# Patient Record
Sex: MALE | Race: WHITE
[De-identification: names, ages, dates, MRNs, and addresses within clinical notes are randomized per-mention and may not be internally consistent; named-entity substitution may affect disease eponyms.]

---

## 2017-09-18 ENCOUNTER — HOSPITAL ENCOUNTER (EMERGENCY)
Dept: HOSPITAL 56 - MW.ED | Age: 42
Discharge: HOME | End: 2017-09-18
Payer: MEDICARE

## 2017-09-18 VITALS — DIASTOLIC BLOOD PRESSURE: 51 MMHG | SYSTOLIC BLOOD PRESSURE: 104 MMHG

## 2017-09-18 DIAGNOSIS — Z88.5: ICD-10-CM

## 2017-09-18 DIAGNOSIS — B34.9: Primary | ICD-10-CM

## 2017-09-18 DIAGNOSIS — Z79.899: ICD-10-CM

## 2017-09-18 DIAGNOSIS — Z79.82: ICD-10-CM

## 2017-09-18 DIAGNOSIS — E11.9: ICD-10-CM

## 2017-09-18 LAB
CHLORIDE SERPL-SCNC: 104 MMOL/L (ref 98–110)
SODIUM SERPL-SCNC: 139 MMOL/L (ref 136–146)

## 2017-09-18 PROCEDURE — 96361 HYDRATE IV INFUSION ADD-ON: CPT

## 2017-09-18 PROCEDURE — 81001 URINALYSIS AUTO W/SCOPE: CPT

## 2017-09-18 PROCEDURE — 36415 COLL VENOUS BLD VENIPUNCTURE: CPT

## 2017-09-18 PROCEDURE — 96365 THER/PROPH/DIAG IV INF INIT: CPT

## 2017-09-18 PROCEDURE — 80053 COMPREHEN METABOLIC PANEL: CPT

## 2017-09-18 PROCEDURE — 96375 TX/PRO/DX INJ NEW DRUG ADDON: CPT

## 2017-09-18 PROCEDURE — 99284 EMERGENCY DEPT VISIT MOD MDM: CPT

## 2017-09-18 PROCEDURE — 85025 COMPLETE CBC W/AUTO DIFF WBC: CPT

## 2017-09-18 PROCEDURE — 71020: CPT

## 2017-09-18 NOTE — EDM.PDOC
ED HPI GENERAL MEDICAL PROBLEM





- General


Chief Complaint: Fever


Stated Complaint: FEVER


Time Seen by Provider: 09/18/17 19:37





- History of Present Illness


INITIAL COMMENTS - FREE TEXT/NARRATIVE: 





HISTORY AND PHYSICAL:





History of present illness:


Patient 42-year-old white male history diabetes who presents with concern of 

vomiting and fever states he had a temperature of 102 at home and vomited 3 

times a night he denies abdominal pain chest pain cough or other concern. Upon 

arrival is temperature is 90.9 he states he did take Tylenol at 6:00





Review of systems: 


As per history of present illness and below otherwise all systems reviewed and 

negative.





Past medical history: 


As per history of present illness and as reviewed below otherwise 

noncontributory.





Surgical history: 


As per history of present illness and as reviewed below otherwise 

noncontributory.





Social history: 


No reported history of drug or alcohol abuse.





Family history: 


As per history of present illness and as reviewed below otherwise 

noncontributory.





Physical exam:


HEENT: Atraumatic, normocephalic, pupils reactive, negative for conjunctival 

pallor or scleral icterus, mucous membranes moist, throat clear, neck supple, 

nontender, trachea midline.


Lungs: Clear to auscultation, breath sounds equal bilaterally, chest nontender.


Heart: S1S2, regular, negative for clicks, rubs, or JVD.


Abdomen: Soft, nondistended, nontender. Negative for masses or 

hepatosplenomegaly. Negative for costovertebral tenderness.


Pelvis: Stable nontender.


Genitourinary: Deferred.


Rectal: Deferred.


Extremities: Atraumatic, negative for cords or calf pain. Neurovascular 

unremarkable.


Neuro: Awake, alert, oriented. Cranial nerves II through XII unremarkable. 

Cerebellum unremarkable. Motor and sensory unremarkable throughout. Exam 

nonfocal.





Diagnostics:


CBC CMP chest x-ray UA





Therapeutics:


Normal saline 1 L bolus Zofran 4 mg IV





Impression: 


#1 history of fever #2 vomiting #3 history diabetes





Definitive disposition and diagnosis as appropriate pending reevaluation and 

review of above.





- Related Data


 Allergies











Allergy/AdvReac Type Severity Reaction Status Date / Time


 


codeine AdvReac  Nose Bleeds Verified 09/18/17 19:37











Home Meds: 


 Home Meds





Aspirin [Adult Low Dose Aspirin EC] 81 mg PO QID 07/01/14 [History]


Budesonide/Formoterol [Symbicort 160-4.5 Mcg Inhaler] 1 puff INH BID 07/01/14 [

History]


Lansoprazole [Prevacid] 15 mg PO DAILY 07/01/14 [History]


carBAMazepine [Tegretol XR] 200 mg PO BID 07/01/14 [History]











Past Medical History


Neurological History: Reports: Seizure





Social & Family History





- Tobacco Use


Smoking Status *Q: Never Smoker





- Recreational Drug Use


Recreational Drug Use: No





ED ROS GENERAL





- Review of Systems


Review Of Systems: ROS reveals no pertinent complaints other than HPI.





ED EXAM, GENERAL





- Physical Exam


Exam: See Below





Course





- Vital Signs


Last Recorded V/S: 


 Last Vital Signs











Temp  38.2 C H  09/18/17 21:50


 


Pulse  106 H  09/18/17 21:50


 


Resp  22 H  09/18/17 21:50


 


BP  112/60   09/18/17 21:50


 


Pulse Ox  90 L  09/18/17 21:50














- Orders/Labs/Meds


Orders: 


 Active Orders 24 hr











 Category Date Time Status


 


 Chest 2V [CR] Stat Exams  09/18/17 19:41 Taken


 


 Sodium Chloride 0.9% [Saline Flush] Med  09/18/17 19:41 Active





 10 ml FLUSH ASDIRECTED PRN   


 


 Sodium Chloride 0.9% [Saline Flush] Med  09/18/17 19:41 Active





 2.5 ml FLUSH ASDIRECTED PRN   


 


 Saline Lock Insert [OM.PC] Stat Oth  09/18/17 19:40 Ordered








 Medication Orders





Sodium Chloride (Saline Flush)  10 ml FLUSH ASDIRECTED PRN


   PRN Reason: Keep Vein Open


   Last Admin: 09/18/17 20:02  Dose: 10 ml


Sodium Chloride (Saline Flush)  2.5 ml FLUSH ASDIRECTED PRN


   PRN Reason: Keep Vein Open


   Last Admin: 09/18/17 20:02  Dose: 2.5 ml








Labs: 


 Laboratory Tests











  09/18/17 09/18/17 09/18/17 Range/Units





  19:56 19:56 21:05 


 


WBC  10.05    (4.0-11.0)  K/uL


 


RBC  4.24 L    (4.50-5.90)  M/uL


 


Hgb  13.4    (13.0-17.0)  g/dL


 


Hct  41.9    (38.0-50.0)  %


 


MCV  98.8 H    (80.0-98.0)  fL


 


MCH  31.6    (27.0-32.0)  pg


 


MCHC  32.0    (31.0-37.0)  g/dL


 


RDW Std Deviation  49.7    (28.0-62.0)  fl


 


RDW Coeff of Lesvia  14    (11.0-15.0)  %


 


Plt Count  149 L    (150-400)  K/uL


 


MPV  10.60    (7.40-12.00)  fL


 


Neut % (Auto)  81.7 H    (48.0-80.0)  %


 


Lymph % (Auto)  9.2 L    (16.0-40.0)  %


 


Mono % (Auto)  9.0    (0.0-15.0)  %


 


Eos % (Auto)  0.0    (0.0-7.0)  %


 


Baso % (Auto)  0.1    (0.0-1.5)  %


 


Neut # (Auto)  8.2 H    (1.4-5.7)  K/uL


 


Lymph # (Auto)  0.9    (0.6-2.4)  K/uL


 


Mono # (Auto)  0.9 H    (0.0-0.8)  K/uL


 


Eos # (Auto)  0.0    (0.0-0.7)  K/uL


 


Baso # (Auto)  0.0    (0.0-0.1)  K/uL


 


Nucleated RBC %  0.0    /100WBC


 


Nucleated RBCs #  0    K/uL


 


Sodium   139   (136-146)  mmol/L


 


Potassium   4.4   (3.5-5.1)  mmol/L


 


Chloride   104   ()  mmol/L


 


Carbon Dioxide   24   (21-31)  mmol/L


 


BUN   13   (6.0-23.0)  mg/dL


 


Creatinine   1.2   (0.6-1.5)  mg/dL


 


Est Cr Clr Drug Dosing   97.45   mL/min


 


Estimated GFR (MDRD)   > 60.0   ml/min


 


Glucose   151 H   ()  mg/dL


 


Calcium   9.0   (8.8-10.8)  mg/dL


 


Total Bilirubin   0.8   (0.1-1.5)  mg/dL


 


AST   23   (5-40)  IU/L


 


ALT   18   (8-54)  IU/L


 


Alkaline Phosphatase   89   ()  


 


Total Protein   7.7   (6.0-8.0)  g/dL


 


Albumin   3.9   (3.5-5.0)  g/dL


 


Globulin   3.8 H   (2.0-3.5)  g/dL


 


Albumin/Globulin Ratio   1.0 L   (1.3-2.8)  


 


Urine Color    YELLOW  


 


Urine Appearance    CLEAR  


 


Urine pH    7.5  (5.0-8.0)  


 


Ur Specific Gravity    1.015  (1.001-1.035)  


 


Urine Protein    30  (NEGATIVE)  mg/dL


 


Urine Glucose (UA)    NEGATIVE  (NEGATIVE)  mg/dL


 


Urine Ketones    TRACE H  (NEGATIVE)  mg/dL


 


Urine Occult Blood    NEGATIVE  (NEGATIVE)  


 


Urine Nitrite    NEGATIVE  (NEGATIVE)  


 


Urine Bilirubin    SMALL H  (NEGATIVE)  


 


Urine Ictotest    NEGATIVE  


 


Urine Urobilinogen    1.0  (<2.0)  EU/dL


 


Ur Leukocyte Esterase    NEGATIVE  (NEGATIVE)  


 


Urine RBC    0-1  (0-2/HPF)  


 


Urine WBC    3-5  (0-5/HPF)  


 


Ur Epithelial Cells    MODERATE  (NONE-FEW)  


 


Urine Bacteria    FEW  (NEGATIVE)  











Meds: 


Medications











Generic Name Dose Route Start Last Admin





  Trade Name Alona  PRN Reason Stop Dose Admin


 


Sodium Chloride  10 ml  09/18/17 19:41  09/18/17 20:02





  Saline Flush  FLUSH   10 ml





  ASDIRECTED PRN   Administration





  Keep Vein Open   


 


Sodium Chloride  2.5 ml  09/18/17 19:41  09/18/17 20:02





  Saline Flush  FLUSH   2.5 ml





  ASDIRECTED PRN   Administration





  Keep Vein Open   














Discontinued Medications














Generic Name Dose Route Start Last Admin





  Trade Name Alona  PRN Reason Stop Dose Admin


 


Sodium Chloride  1,000 mls @ 999 mls/hr  09/18/17 19:41  09/18/17 20:01





  Normal Saline  IV  09/18/17 20:41  999 mls/hr





  STAT ONE   Administration


 


Ondansetron HCl  4 mg  09/18/17 19:41  09/18/17 20:01





  Zofran  IVPUSH  09/18/17 19:42  4 mg





  ONETIME ONE   Administration














Departure





- Departure


Time of Disposition: 21:56


Disposition: Home, Self-Care 01


Condition: Good


Clinical Impression: 


 Viral syndrome








- Discharge Information


Forms:  ED Department Discharge


Additional Instructions: 


The following information is given to patients seen in the emergency department 

who are being discharged to home. This information is to outline your options 

for follow-up care. We provide all patients seen in our emergency department 

with a follow-up referral.





The need for follow-up, as well as the timing and circumstances, are variable 

depending upon the specifics of your emergency department visit.





If you don't have a primary care physician on staff, we will provide you with a 

referral. We always advise you to contact your personal physician following an 

emergency department visit to inform them of the circumstance of the visit and 

for follow-up with them and/or the need for any referrals to a consulting 

specialist.





The emergency department will also refer you to a specialist when appropriate. 

This referral assures that you have the opportunity for followup care with a 

specialist. All of these measure are taken in an effort to provide you with 

optimal care, which includes your followup.





Under all circumstances we always encourage you to contact your private 

physician who remains a resource for coordinating  your care. When calling for 

followup care, please make the office aware that this follow-up is from your 

recent emergency room visit. If for any reason you are refused follow-up, 

please contact the [facility] emergency department at [phone] 

and asked to speak to the emergency department charge nurse.


























Push fluids clear liquids as directed continue current medications return as 

needed as discussed Motrin/Tylenol as directed





- My Orders


Last 24 Hours: 


My Active Orders





09/18/17 19:40


Saline Lock Insert [OM.PC] Stat 





09/18/17 19:41


Chest 2V [CR] Stat 


Sodium Chloride 0.9% [Saline Flush]   10 ml FLUSH ASDIRECTED PRN 


Sodium Chloride 0.9% [Saline Flush]   2.5 ml FLUSH ASDIRECTED PRN 














- Assessment/Plan


Last 24 Hours: 


My Active Orders





09/18/17 19:40


Saline Lock Insert [OM.PC] Stat 





09/18/17 19:41


Chest 2V [CR] Stat 


Sodium Chloride 0.9% [Saline Flush]   10 ml FLUSH ASDIRECTED PRN 


Sodium Chloride 0.9% [Saline Flush]   2.5 ml FLUSH ASDIRECTED PRN

## 2017-09-19 NOTE — CR
EXAM DATE: 17



PATIENT'S AGE: 42





Patient: CHANDU HONG



Facility: [place], [place]

Patient ID: [id number]

Site Patient ID: [id number].

Site Accession #: [id number]

: 1975

Study: XRay Chest [id number]/18/2017 9:11:55 PM

Ordering Physician: Westley Castañeda



Final Report: 

CHEST 2 VIEWS



INDICATION:

Short of breath. 



IMPRESSION:

Normal heart size and vascular pattern.

Lungs are clear.

No pneumothorax or pleural abnormality.





Dictated by Pineda Ribeiro MD @ Sep 18 2017 9:35PM

(Electronic Signature)



Report Signed by Proxy.
[facility]

## 2017-11-14 ENCOUNTER — HOSPITAL ENCOUNTER (OUTPATIENT)
Dept: HOSPITAL 56 - MW.SDS | Age: 42
Discharge: HOME | End: 2017-11-14
Attending: SURGERY
Payer: COMMERCIAL

## 2017-11-14 VITALS — DIASTOLIC BLOOD PRESSURE: 63 MMHG | SYSTOLIC BLOOD PRESSURE: 109 MMHG

## 2017-11-14 DIAGNOSIS — Z79.84: ICD-10-CM

## 2017-11-14 DIAGNOSIS — Z79.899: ICD-10-CM

## 2017-11-14 DIAGNOSIS — K29.50: Primary | ICD-10-CM

## 2017-11-14 DIAGNOSIS — E66.01: ICD-10-CM

## 2017-11-14 DIAGNOSIS — Z88.8: ICD-10-CM

## 2017-11-14 PROCEDURE — 43239 EGD BIOPSY SINGLE/MULTIPLE: CPT

## 2017-11-14 NOTE — PCM.OPNOTE
- General Post-Op/Procedure Note


Date of Surgery/Procedure: 11/14/17


Operative Procedure(s): egd w bx


Findings: 


see dict [id number]


Pre Op Diagnosis: hx of hematemisis


Anesthesia Technique: Moderate Sedation


Primary Surgeon: Ruddy Bailey


Pathology: 





egd bx


Complications: None


Condition: Good

## 2017-11-14 NOTE — PCM.PREANE
Preanesthetic Assessment





- Anesthesia/Transfusion/Family Hx


Anesthesia History: No Prior Anesthesia


Family History of Anesthesia Reaction: No


Transfusion History: No Prior Transfusion(s)





- Review of Systems


General: No Symptoms


Pulmonary: No Symptoms


Cardiovascular: No Symptoms


Neurological: No Symptoms


Other: Reports: None





- Physical Assessment


NPO Status Date: 11/13/17


O2 Sat by Pulse Oximetry: 97


Respiratory Rate: 16


Vital Signs: 





 Last Vital Signs











Temp  36.2 C   11/14/17 08:13


 


Pulse  71   11/14/17 08:13


 


Resp  16   11/14/17 08:13


 


BP  116/70   11/14/17 08:13


 


Pulse Ox  97   11/14/17 08:13











Height: 1.8 m


Weight: 174.633 kg


ASA Class: 3


Mental Status: Alert & Oriented x3


Airway Class: Mallampati = 2


Dentition: Reports: Normal Dentition


ROM/Head Extension: Full


Lungs: Clear to Auscultation, Normal Respiratory Effort


Cardiovascular: Regular Rate, Regular Rhythm





- Allergies


Allergies/Adverse Reactions: 


 Allergies











Allergy/AdvReac Type Severity Reaction Status Date / Time


 


codeine AdvReac  Nose Bleeds Verified 11/08/17 15:41














- Anesthesia Plan


Pre-Op Medication Ordered: None





- Acknowledgements


Anesthesia Type Planned: MAC


Pt an Appropriate Candidate for the Planned Anesthesia: Yes


Alternatives and Risks of Anesthesia Discussed w Pt/Guardian: Yes


Pt/Guardian Understands and Agrees with Anesthesia Plan: Yes


Additional Comments: 





PMH: super morbid oesity, DM2, HTN, HLD, seizure disorder (last seizure > 2 yr 

ago)





PreAnesthesia Questionnaire


Other HEENT History: has upper and lower dentures


Cardiovascular History: Reports: High Cholesterol, Hypertension


Respiratory History: Reports: Asthma


Gastrointestinal History: Reports: GERD


Neurological History: Reports: Brain Injury, Concussion, Seizure


Other Neuro History: las seizure was "many years ago"


Endocrine/Metabolic History: Reports: Diabetes, Type II, Obesity/BMI 30+


Dermatologic History: Reports: Other (See Below)


Other Dermatologic History: states has had "blood clots" in his legs,  did not 

take anticoagulants (uses cream)





- Infectious Disease History


Infectious Disease History: Reports: Chicken Pox





- SUBSTANCE USE


Smoking Status *Q: Never Smoker


Second Hand Smoke Exposure: No


Recreational Drug Use History: No





- HOME MEDS


Home Medications: 


 Home Meds





Budesonide/Formoterol [Symbicort 160-4.5 Mcg Inhaler] 1 puff INH BID 07/01/14 [

History]


carBAMazepine [Tegretol XR] 200 mg PO BID 07/01/14 [History]


Lisinopril 10 mg PO DAILY 09/18/17 [History]


atorvaSTATin [Lipitor] 10 mg PO BEDTIME 09/18/17 [History]


metFORMIN [Glucophage XR] 500 mg PO BIDMEALS 09/18/17 [History]


Silver Sulfadiazine [Silvadene 1% Cream 20 GM] 1 dose TOP ASDIRECTED PRN 11/08/ 17 [History]











- CURRENT (IN HOUSE) MEDS


Current Meds: 





 Current Medications





Lactated Ringer's (Ringers, Lactated)  1,000 mls @ 125 mls/hr IV ASDIRECTED HESHAM


   Last Admin: 11/14/17 08:15 Dose:  125 mls/hr

## 2017-11-14 NOTE — OR
SURGEON:

Ruddy Bailey MD

 

DATE OF PROCEDURE:  11/14/2017

 

PREOPERATIVE DIAGNOSIS:

History of hematemesis.

 

POSTOPERATIVE DIAGNOSIS:

Gastritis.

 

PROCEDURE PERFORMED:

EGD with biopsy.

 

DESCRIPTION OF PROCEDURE:

EGD: The patient was taken to the endoscopy room, and with the CRNA, Diprivan

was administered.

 

A well-lubricated EGD scope was gently inserted through the oropharynx, down the

esophagus, passing through the gastroesophageal junction, into the stomach.  The

mucosa was examined upon the passage.  Any etiology will be noted.  Once in the

stomach, we continued to advance to the distal antrum, passed through the

pylorus into the second portion of the duodenum.  Again, the mucosa was examined

for any abnormality and etiology.

 

The scope was then retrieved back to the stomach and then retroflexed to look at

the fundus of the stomach.  If a biopsy was indicated, we will biopsy the

antrum, body, and gastroesophageal junction.  The air will be sucked out while

the scope is retrieved to reduce the patient's discomfort.

 

The patient tolerated the procedure well.  There were no intraoperative

complications.  Dr. Bailey was present through the whole procedure.

 

Prior to surgery, a time-out had been called, the patient identified, procedure

identified and antibiotic administered.

 

FINDINGS:

1. The patient is easily sedated with CRNA and Diprivan.  The patient is

    soundly snoring.

2. Proximal esophagus and oropharynx, free of disease and normal in

    appearance.  No inflammation, no blood, no ulcer, and GE junction at 40,

    shows mild color change, salmon color change, consistent with mild acid

    reflux.  Stomach rugae is normal in appearance and antrum is quite

    inflamed.  Again, there is no ulcer, blood, food, or bile observed.

    Duodenum is grossly normal in appearance, but is white consistent with

    probably have some food after midnight.  Scope retrieved back to the

    stomach.  Retroflexed look at the fundus of stomach, no hiatal hernia.

    Biopsy done at antrum, body, and GE junction at 40, does have one area in

    the antrum quite hyperemic. Suck out the air while scope

    pulling out.

 

 

AKANKSHA FAITH

DD:  11/14/2017 09:59:26

DT:  11/14/2017 14:01:41

Job #:  [id number]

SHAGGY

## 2018-01-12 ENCOUNTER — HOSPITAL ENCOUNTER (EMERGENCY)
Dept: HOSPITAL 56 - MW.ED | Age: 43
Discharge: HOME | End: 2018-01-12
Payer: MEDICARE

## 2018-01-12 VITALS — DIASTOLIC BLOOD PRESSURE: 47 MMHG | SYSTOLIC BLOOD PRESSURE: 112 MMHG

## 2018-01-12 DIAGNOSIS — Z79.84: ICD-10-CM

## 2018-01-12 DIAGNOSIS — Z79.899: ICD-10-CM

## 2018-01-12 DIAGNOSIS — E11.9: ICD-10-CM

## 2018-01-12 DIAGNOSIS — E78.00: ICD-10-CM

## 2018-01-12 DIAGNOSIS — J45.909: ICD-10-CM

## 2018-01-12 DIAGNOSIS — B34.9: Primary | ICD-10-CM

## 2018-01-12 DIAGNOSIS — I10: ICD-10-CM

## 2018-01-12 DIAGNOSIS — Z88.5: ICD-10-CM

## 2018-01-12 LAB
CHLORIDE SERPL-SCNC: 106 MMOL/L (ref 98–110)
SODIUM SERPL-SCNC: 140 MMOL/L (ref 136–146)

## 2018-01-12 PROCEDURE — 83690 ASSAY OF LIPASE: CPT

## 2018-01-12 PROCEDURE — 94640 AIRWAY INHALATION TREATMENT: CPT

## 2018-01-12 PROCEDURE — 96361 HYDRATE IV INFUSION ADD-ON: CPT

## 2018-01-12 PROCEDURE — 99284 EMERGENCY DEPT VISIT MOD MDM: CPT

## 2018-01-12 PROCEDURE — 83605 ASSAY OF LACTIC ACID: CPT

## 2018-01-12 PROCEDURE — 85025 COMPLETE CBC W/AUTO DIFF WBC: CPT

## 2018-01-12 PROCEDURE — 36415 COLL VENOUS BLD VENIPUNCTURE: CPT

## 2018-01-12 PROCEDURE — 80053 COMPREHEN METABOLIC PANEL: CPT

## 2018-01-12 PROCEDURE — 96374 THER/PROPH/DIAG INJ IV PUSH: CPT

## 2018-01-12 PROCEDURE — 96375 TX/PRO/DX INJ NEW DRUG ADDON: CPT

## 2018-01-12 PROCEDURE — 82150 ASSAY OF AMYLASE: CPT

## 2018-01-12 NOTE — EDM.PDOC
ED HPI GENERAL MEDICAL PROBLEM





- General


Chief Complaint: Gastrointestinal Problem


Stated Complaint: VOMITTING


Time Seen by Provider: 01/12/18 10:52


Source of Information: Reports: Patient, Family


History Limitations: Reports: No Limitations





- History of Present Illness


INITIAL COMMENTS - FREE TEXT/NARRATIVE: 





HISTORY AND PHYSICAL:





History of present illness:


[Patient is brought to the emergency room by his father with complaints of 

nausea and vomiting. He lives w/ his parents, and father is his main caretaker. 

Symptoms of vomiting and fatigue began this morning and he has vomited 3 times. 

Admits to experiencing nausea and queasiness in his stomach, but denies 

abdominal pain constipation and diarrhea. 


He has not checked his temperature today. No sore throat runny nose or 

earaches. He developed some cold sores on his lips several days ago. No cough 

or chest congestion. Denies chest pain shortness of breath and difficulty 

breathing. Denies body, muscle and joint aches or pains.


Dad reports that he was recently diagnosed with parasites in his stomach 

following a scope by Dr. Bailey. He is overdue for follow-up there. Dr. Myke Pickett 

is his PCP.]





Review of systems: 


As per history of present illness and below otherwise all systems reviewed and 

negative.





Past medical history: 


As per history of present illness and as reviewed below otherwise 

noncontributory.





Surgical history: 


As per history of present illness and as reviewed below otherwise 

noncontributory.





Social history: 


No reported history of drug or alcohol abuse.





Family history: 


As per history of present illness and as reviewed below otherwise 

noncontributory.





Physical exam:


Gen.: Well-developed, well-nourished  male in no acute distress. 

Appears to be intellectually delayed as dad answers most questions and provides 

most of past medical history.


HEENT: Atraumatic, normocephalic. Cold sores present to the left side of mouth 

upper and lower lips are involved. Oral mucous membranes are pink and mildly 

dry. Neck is supple no lymphadenopathy. 


Lungs: Clear to auscultation, breath sounds equal bilaterally. No wheezing 

crackles or rales. 


Heart: S1S2, regular rate and rhythm. 


Abdomen: Obese. Abdomen is soft, nondistended, nontender. Negative for masses, 

guarding or rebound.  No costovertebral tenderness.


Pelvis: Stable nontender.


Genitourinary: Deferred.


Rectal: Deferred.


Extremities: Atraumatic, mild rubor to bilat LE. No cyanosis or edema. 

Neurovascular unremarkable.


Neuro: Awake, alert, oriented.  Motor and sensory unremarkable throughout. Exam 

nonfocal.





Diagnostics:


[CBC, CMP, UA]





Therapeutics:


[1 liter NS, 4mg Zofran IV, Tylenol 1000mg po, Toradol 30mg IV]





Impression: 


[Viral syndrome]





Plan:


[Discussed with patient and father that his symptoms are viral in nature. 

Recommend pushing fluids, get plenty of rest, Tylenol and ibuprofen as needed 

for fever or discomfort. Strict return precautions are reviewed with the 

patient. He is urged to follow-up with Dr. Pickett and Dr. Blanca. Patient and 

daughter are in agreement with today's discussion. All questions are answered 

and concerns are addressed.]





Definitive disposition and diagnosis as appropriate pending reevaluation and 

review of above.





  ** Abdominal


Pain Score (Numeric/FACES): 0





- Related Data


 Allergies











Allergy/AdvReac Type Severity Reaction Status Date / Time


 


codeine AdvReac  Nose Bleeds Verified 11/08/17 15:41











Home Meds: 


 Home Meds





Budesonide/Formoterol [Symbicort 160-4.5 MCG] 1 puff INH BID 07/01/14 [History]


carBAMazepine [Tegretol XR] 200 mg PO BID 07/01/14 [History]


Lisinopril 10 mg PO DAILY 09/18/17 [History]


atorvaSTATin [Lipitor] 10 mg PO BEDTIME 09/18/17 [History]


metFORMIN [Glucophage XR] 500 mg PO BIDMEALS 09/18/17 [History]


Silver Sulfadiazine [Silvadene 1% Cream 20 GM] 1 dose TOP ASDIRECTED PRN 11/08/ 17 [History]











Past Medical History


Other HEENT History: has upper and lower dentures


Cardiovascular History: Reports: High Cholesterol, Hypertension


Respiratory History: Reports: Asthma


Gastrointestinal History: Reports: GERD, Helicobacter Pylori


Neurological History: Reports: Brain Injury, Concussion, Seizure


Other Neuro History: las seizure was "many years ago"


Endocrine/Metabolic History: Reports: Diabetes, Type II, Obesity/BMI 30+


Dermatologic History: Reports: Other (See Below)


Other Dermatologic History: states has had "blood clots" in his legs,  did not 

take anticoagulants (uses cream)





- Infectious Disease History


Infectious Disease History: Reports: Chicken Pox





- Past Surgical History


GI Surgical History: Reports: EGD





Social & Family History





- Family History


Family Medical History: Noncontributory


Cardiac: Reports: Hypertension


Respiratory: Reports: Asthma


Neurological: Reports: Other (See Below)


Other Neurological Family History: stroke


Endocrine/Metabolic: Reports: Diabetes, type II





- Tobacco Use


Smoking Status *Q: Never Smoker


Second Hand Smoke Exposure: No





- Caffeine Use


Caffeine Use: Reports: Soda





- Recreational Drug Use


Recreational Drug Use: No


Drug Use in Last 12 Months: No





ED ROS GENERAL





- Review of Systems


Review Of Systems: ROS reveals no pertinent complaints other than HPI.





ED EXAM, GI/ABD





- Physical Exam


Exam: See Below





Course





- Vital Signs


Last Recorded V/S: 


 Last Vital Signs











Temp  99.3 F   01/12/18 12:26


 


Pulse  86   01/12/18 12:21


 


Resp  16   01/12/18 12:21


 


BP  115/44 L  01/12/18 12:21


 


Pulse Ox  92 L  01/12/18 12:21














- Orders/Labs/Meds


Orders: 


 Active Orders 24 hr











 Category Date Time Status


 


 UA W/MICROSCOPIC [URIN] Stat Lab  01/12/18 10:56 Uncollected











Labs: 


 Laboratory Tests











  01/12/18 01/12/18 01/12/18 Range/Units





  11:16 11:16 11:16 


 


WBC  10.56    (4.0-11.0)  K/uL


 


RBC  4.44 L    (4.50-5.90)  M/uL


 


Hgb  13.9    (13.0-17.0)  g/dL


 


Hct  42.7    (38.0-50.0)  %


 


MCV  96.2    (80.0-98.0)  fL


 


MCH  31.3    (27.0-32.0)  pg


 


MCHC  32.6    (31.0-37.0)  g/dL


 


RDW Std Deviation  46.4    (28.0-62.0)  fl


 


RDW Coeff of Lesvia  13    (11.0-15.0)  %


 


Plt Count  140 L    (150-400)  K/uL


 


MPV  10.50    (7.40-12.00)  fL


 


Neut % (Auto)  82.5 H    (48.0-80.0)  %


 


Lymph % (Auto)  7.8 L    (16.0-40.0)  %


 


Mono % (Auto)  9.6    (0.0-15.0)  %


 


Eos % (Auto)  0.0    (0.0-7.0)  %


 


Baso % (Auto)  0.1    (0.0-1.5)  %


 


Neut # (Auto)  8.7 H    (1.4-5.7)  K/uL


 


Lymph # (Auto)  0.8    (0.6-2.4)  K/uL


 


Mono # (Auto)  1.0 H    (0.0-0.8)  K/uL


 


Eos # (Auto)  0.0    (0.0-0.7)  K/uL


 


Baso # (Auto)  0.0    (0.0-0.1)  K/uL


 


Nucleated RBC %  0.0    /100WBC


 


Nucleated RBCs #  0    K/uL


 


Lactate    1.6  (0.20-2.00)  mmol/L


 


Sodium   140   (136-146)  mmol/L


 


Potassium   4.3   (3.5-5.1)  mmol/L


 


Chloride   106   ()  mmol/L


 


Carbon Dioxide   26   (21-31)  mmol/L


 


BUN   13   (6.0-23.0)  mg/dL


 


Creatinine   1.1   (0.6-1.5)  mg/dL


 


Est Cr Clr Drug Dosing   111.94   mL/min


 


Estimated GFR (MDRD)   > 60.0   ml/min


 


Glucose   155 H   ()  mg/dL


 


Calcium   8.5 L   (8.8-10.8)  mg/dL


 


Total Bilirubin   0.6   (0.1-1.5)  mg/dL


 


AST   17   (5-40)  IU/L


 


ALT   16   (8-54)  IU/L


 


Alkaline Phosphatase   75   ()  


 


Total Protein   7.0   (6.0-8.0)  g/dL


 


Albumin   3.6   (3.5-5.0)  g/dL


 


Globulin   3.4   (2.0-3.5)  g/dL


 


Albumin/Globulin Ratio   1.1 L   (1.3-2.8)  


 


Amylase   22   (10-90)  U/L


 


Lipase   22   (7-80)  U/L











Meds: 


Medications














Discontinued Medications














Generic Name Dose Route Start Last Admin





  Trade Name Freq  PRN Reason Stop Dose Admin


 


Acetaminophen  1,000 mg  01/12/18 11:45  01/12/18 12:26





  Tylenol Extra Strength  PO  01/12/18 11:46  1,000 mg





  ONETIME ONE   Administration


 


Sodium Chloride  1,000 mls @ 999 mls/hr  01/12/18 10:56  01/12/18 11:23





  Normal Saline  IV  01/12/18 11:56  999 mls/hr





  .Bolus ONE   Administration


 


Ketorolac Tromethamine  30 mg  01/12/18 10:56  01/12/18 11:34





  Toradol  IVPUSH  01/12/18 10:57  30 mg





  ONETIME ONE   Administration


 


Ondansetron HCl  4 mg  01/12/18 10:56  01/12/18 11:32





  Zofran  IVPUSH  01/12/18 10:57  4 mg





  ONETIME ONE   Administration














Departure





- Departure


Time of Disposition: 12:25


Disposition: Home, Self-Care 01


Condition: Good


Clinical Impression: 


 Viral syndrome








- Discharge Information


Instructions:  Viral Respiratory Infection, Easy-To-Read


Referrals: 


Myke Pickett MD [Primary Care Provider] - 


Forms:  ED Department Discharge


Additional Instructions: 


The following information is given to patients seen in the emergency department 

who are being discharged to home. This information is to outline your options 

for follow-up care. We provide all patients seen in our emergency department 

with a follow-up referral.





The need for follow-up, as well as the timing and circumstances, are variable 

depending upon the specifics of your emergency department visit.





If you don't have a primary care physician on staff, we will provide you with a 

referral. We always advise you to contact your personal physician following an 

emergency department visit to inform them of the circumstance of the visit and 

for follow-up with them and/or the need for any referrals to a consulting 

specialist.





The emergency department will also refer you to a specialist when appropriate. 

This referral assures that you have the opportunity for follow-up care with a 

specialist. All of these measure are taken in an effort to provide you with 

optimal care, which includes your follow-up.





Under all circumstances we always encourage you to contact your private 

physician who remains a resource for coordinating your care. When calling for 

follow-up care, please make the office aware that this follow-up is from your 

recent emergency room visit. If for any reason you are refused follow-up, 

please contact the [facility]  emergency 

department at [phone] and asked to speak to the emergency department 

charge nurse.








[address] [place] [address]


Phone: [phone]


Fax: [phone]








You are suffering from a viral illness. You may feel ill for a couple of days.


Recommend that you push fluids, and get plenty of rest. Recommend Tylenol 

alternating with ibuprofen for fever or discomfort.


You may take either medication every 4 hours.


Stay away from dairy products, greasy and fatty foods until you are feeling 

improved.


Follow-up with Dr. Pickett in 48-72 hours.


Return to ER as needed as discussed.








- My Orders


Last 24 Hours: 


My Active Orders





01/12/18 10:56


UA W/MICROSCOPIC [URIN] Stat 














- Assessment/Plan


Last 24 Hours: 


My Active Orders





01/12/18 10:56


UA W/MICROSCOPIC [URIN] Stat

## 2018-08-10 ENCOUNTER — HOSPITAL ENCOUNTER (OUTPATIENT)
Dept: HOSPITAL 56 - MW.SDS | Age: 43
Discharge: HOME | End: 2018-08-10
Attending: SURGERY
Payer: MEDICARE

## 2018-08-10 VITALS — SYSTOLIC BLOOD PRESSURE: 128 MMHG | DIASTOLIC BLOOD PRESSURE: 72 MMHG

## 2018-08-10 DIAGNOSIS — Z79.899: ICD-10-CM

## 2018-08-10 DIAGNOSIS — E11.9: ICD-10-CM

## 2018-08-10 DIAGNOSIS — E66.9: ICD-10-CM

## 2018-08-10 DIAGNOSIS — E78.00: ICD-10-CM

## 2018-08-10 DIAGNOSIS — I10: ICD-10-CM

## 2018-08-10 DIAGNOSIS — Z88.5: ICD-10-CM

## 2018-08-10 DIAGNOSIS — K21.0: ICD-10-CM

## 2018-08-10 DIAGNOSIS — Z79.84: ICD-10-CM

## 2018-08-10 DIAGNOSIS — K29.50: Primary | ICD-10-CM

## 2018-08-10 PROCEDURE — 43239 EGD BIOPSY SINGLE/MULTIPLE: CPT

## 2018-08-10 NOTE — PCM.OPNOTE
- General Post-Op/Procedure Note


Date of Surgery/Procedure: 08/10/18


Operative Procedure(s): egd w bx


Findings: 





see dict [id number]


Pre Op Diagnosis: bloating


Post-Op Diagnosis: Same


Anesthesia Technique: Moderate Sedation


Primary Surgeon: Ruddy Bailey


Pathology: 





egd bx


Complications: None


Condition: Good

## 2018-08-10 NOTE — PCM48HPAN
Post Anesthesia Note





- EVALUATION WITHIN 48HRS OF ANESTHETIC


Vital Signs in Normal Range: Yes


Patient Participated in Evaluation: Yes


Respiratory Function Stable: Yes


Airway Patent: Yes


Cardiovascular Function Stable: Yes


Hydration Status Stable: Yes


Pain Control Satisfactory: Yes


Nausea and Vomiting Control Satisfactory: Yes


Mental Status Recovered: Yes


Resp Rate: 12

## 2018-08-10 NOTE — PCM.PREANE
Preanesthetic Assessment





- Anesthesia/Transfusion/Family Hx


Anesthesia History: Prior Anesthesia Without Reaction


Family History of Anesthesia Reaction: No


Transfusion History: No Prior Transfusion(s)





- Review of Systems


General: No Symptoms


Cardiovascular: No Symptoms


Neurological: No Symptoms


Other: Reports: None





- Physical Assessment


NPO Status Date: 08/09/18


O2 Sat by Pulse Oximetry: 93


Respiratory Rate: 18


Vital Signs: 





 Last Vital Signs











Temp  36.4 C   08/10/18 09:24


 


Pulse  56 L  08/10/18 09:24


 


Resp  18   08/10/18 09:24


 


BP  127/77   08/10/18 09:24


 


Pulse Ox  93 L  08/10/18 09:24











Height: 1.98 m


Weight: 171.912 kg


ASA Class: 3


Mental Status: Alert & Oriented x3


Airway Class: Mallampati = 1


Dentition: Reports: Edentulous


ROM/Head Extension: Full


Lungs: Clear to Auscultation, Normal Respiratory Effort


Cardiovascular: Regular Rate, Regular Rhythm





- Allergies


Allergies/Adverse Reactions: 


 Allergies











Allergy/AdvReac Type Severity Reaction Status Date / Time


 


codeine AdvReac  Nose Bleeds Verified 08/07/18 08:37














- Blood


Blood Available: No





- Anesthesia Plan


Pre-Op Medication Ordered: None





- Acknowledgements


Anesthesia Type Planned: MAC


Pt an Appropriate Candidate for the Planned Anesthesia: Yes


Alternatives and Risks of Anesthesia Discussed w Pt/Guardian: Yes


Pt/Guardian Understands and Agrees with Anesthesia Plan: Yes


Additional Comments: 





PMH: hx of seizure disorder, no seizures since 1985, is on tegretal, DM2, HLD, 

morbid obesity.





PreAnesthesia Questionnaire


HEENT History: Reports: Other (See Below)


Other HEENT History: has upper and lower dentures


Cardiovascular History: Reports: High Cholesterol, Hypertension


Respiratory History: Reports: Asthma


Gastrointestinal History: Reports: GERD, Helicobacter Pylori


Genitourinary History: Reports: None


Neurological History: Reports: Brain Injury, Concussion, Seizure


Other Neuro History: seizure was "many years ago"


Endocrine/Metabolic History: Reports: Diabetes, Type II, Obesity/BMI 30+


Dermatologic History: Reports: Other (See Below)


Other Dermatologic History: poor circulation in his legs, (uses cream)





- Infectious Disease History


Infectious Disease History: Reports: Chicken Pox





- Past Surgical History


Head Surgeries/Procedures: Reports: None


GI Surgical History: Reports: EGD





- SUBSTANCE USE


Smoking Status *Q: Never Smoker


Recreational Drug Use History: No





- HOME MEDS


Home Medications: 


 Home Meds





Budesonide/Formoterol [Symbicort 160-4.5 MCG] 1 puff INH BID 07/01/14 [History]


carBAMazepine [Tegretol XR] 200 mg PO BID 07/01/14 [History]


Lisinopril 10 mg PO DAILY 09/18/17 [History]


atorvaSTATin [Lipitor] 5 mg PO BEDTIME 09/18/17 [History]


metFORMIN [Glucophage XR] 500 mg PO BIDMEALS 09/18/17 [History]


Silver Sulfadiazine [Silvadene 1% Cream 20 GM] 1 dose TOP ASDIRECTED PRN 11/08/ 17 [History]


Lansoprazole [Prevacid] 15 mg PO DAILY 08/07/18 [History]











- CURRENT (IN HOUSE) MEDS


Current Meds: 





 Current Medications





Lactated Ringer's (Ringers, Lactated)  1,000 mls @ 125 mls/hr IV ASDIRECTED HESHAM





Discontinued Medications





Lidocaine (Xylocaine-Mpf 2%) Confirm Administered Dose 5 ml .ROUTE .STK-MED ONE


   Stop: 08/10/18 07:12


Midazolam HCl (Versed 1 Mg/Ml) Confirm Administered Dose 2 mg .ROUTE .STK-MED 

ONE


   Stop: 08/10/18 07:12


Propofol (Diprivan  20 Ml) Confirm Administered Dose 200 mg .ROUTE .STK-MED ONE


   Stop: 08/10/18 07:11

## 2018-08-10 NOTE — OR
SURGEON:

Ruddy Bailey MD

 

DATE OF PROCEDURE:  08/10/2018

 

PREOPERATIVE DIAGNOSIS:

Bloating.

 

PROCEDURE PERFORMED:

Esophagogastroduodenoscopy with biopsy.

 

PROCEDURE IN DETAIL:

EGD: The patient was taken to the endoscopy room, and with the CRNA, Diprivan

was administered.

 

A well-lubricated EGD scope was gently inserted through the oropharynx, down the

esophagus, passing through the gastroesophageal junction, into the stomach.  The

mucosa was examined upon the passage.  Any etiology will be noted.  Once in the

stomach, we continued to advance to the distal antrum, passed through the

pylorus into the second portion of the duodenum.  Again, the mucosa was examined

for any abnormality and etiology.

 

The scope was then retrieved back to the stomach and then retroflexed to look at

the fundus of the stomach.  If a biopsy was indicated, we will biopsy the

antrum, body, and gastroesophageal junction.  The air will be sucked out while

the scope is retrieved to reduce the patient's discomfort.

 

The patient tolerated the procedure well.  There were no intraoperative

complications.  Dr. Bailey was present through the whole procedure.

 

Prior to surgery, a time-out had been called, the patient identified, procedure

identified and antibiotic administered.

 

FINDINGS:

1. The patient is easily sedated with CRNA and Diprivan.  The patient is

    soundly snoring.

2. The patient's oropharynx and proximal esophagus are free of disease.  No

    stricture inflammation.  Distal esophagus at 40 shows moderate-to-severe

    acid reflux, salmon-color change with flame-like structure consistent with

    severe acid reflux.  Stomach rugae is normal in appearance.  No blood, no

    ulcer, antrum is normal in appearance, and duodenum was grossly normal.

    Retroflexed look at the fundus of stomach, there is no hiatal hernia.

    Biopsy done at antrum, body, GE junction, and sucked out the gas while

    scope pulling out.

 

 

AKANKSHA / MARCELL

DD:  08/10/2018 11:36:48

DT:  08/10/2018 13:32:35

Job #:  [id number]

## 2019-09-02 NOTE — EDM.PDOC
ED HPI GENERAL MEDICAL PROBLEM





- General


Chief Complaint: Lower Extremity Injury/Pain


Stated Complaint: SORES ON BOTH LEGS- POSSIBLE INFECTION


Time Seen by Provider: 09/02/19 03:06


Source of Information: Reports: Patient





- History of Present Illness


INITIAL COMMENTS - FREE TEXT/NARRATIVE: 





HISTORY AND PHYSICAL:


History of present illness:


[]A shunt presents with his wife he has history of stasis dermatitis, they're 

concerned about infection he does have some superficial open lesions on his 

anterior shins denies any injury or trauma





He also has secondary complaint of diarrhea for 2 weeks as no fever nausea 

vomiting chills sweats no chest pain shortness breath headache dizziness 

palpitation no urine symptoms





Stay in the ER he is unable to provide a stool sample and multiple attempts





Review of systems: 


As per history of present illness and below otherwise all systems reviewed and 

negative.


Past medical history: 


As per history of present illness and as reviewed below otherwise 

noncontributory.


Surgical history: 


As per history of present illness and as reviewed below otherwise 

noncontributory.


Social history: 


No reported history of drug or alcohol abuse.


Family history: 


As per history of present illness and as reviewed below otherwise 

noncontributory.





Physical exam:


HEENT: Atraumatic, normocephalic, pupils reactive, negative for conjunctival 

pallor or scleral icterus, mucous membranes moist, throat clear, neck supple, 

nontender, trachea midline.


Lungs: Clear to auscultation, breath sounds equal bilaterally, chest nontender.


Heart: S1S2, regular, negative for clicks, rubs, or JVD.


Abdomen: Soft, nondistended, nontender. Negative for masses or 

hepatosplenomegaly. Negative for costovertebral tenderness.


Pelvis: Stable nontender.


Genitourinary: Deferred.


Rectal: Deferred.


Extremities: Atraumatic, negative for cords or calf pain. Neurovascular 

unremarkable.


Neuro: Awake, alert, oriented. Cranial nerves II through XII unremarkable. 

Cerebellum unremarkable. Motor and sensory unremarkable throughout. Exam 

nonfocal.





Diagnostics:


[cBC CMP UA


Cultures obtained


Workup ordered however ultimately unable to be performed





Therapeutics:


[Saline


Keflex]





Impression: 


[Gastroenteritis -per patient


Venous stasis ulcers


History of baseline


]


Definitive disposition and diagnosis as appropriate pending reevaluation and 

review of above.


  ** bilateral lower leg


Pain Score (Numeric/FACES): 10





- Related Data


 Allergies











Allergy/AdvReac Type Severity Reaction Status Date / Time


 


codeine AdvReac  Nose Bleeds Verified 09/02/19 00:44











Home Meds: 


 Home Meds





Budesonide/Formoterol [Symbicort 160-4.5 MCG] 2 puff INH BID 07/01/14 [History]


carBAMazepine [Tegretol XR] 200 mg PO DAILY 07/01/14 [History]


Lisinopril 10 mg PO DAILY 09/18/17 [History]


atorvaSTATin [Lipitor] 5 mg PO DAILY 09/18/17 [History]


metFORMIN [Glucophage XR] 1,000 mg PO BIDMEALS 09/18/17 [History]


Lansoprazole [Prevacid] 15 mg PO BID 08/07/18 [History]


Aspirin [Halfprin] 81 mg PO DAILY 09/07/18 [History]


Iron 1 mg PO DAILY 09/07/18 [History]


Albuterol [Ventolin HFA] 2 puff INH Q4H PRN #1 inhaler 09/08/18 [Rx]











Past Medical History


HEENT History: Reports: Other (See Below)


Other HEENT History: has upper and lower dentures, nosebleeds


Cardiovascular History: Reports: High Cholesterol, Hypertension


Respiratory History: Reports: Asthma


Gastrointestinal History: Reports: GERD, Helicobacter Pylori


Genitourinary History: Reports: None


Musculoskeletal History: Reports: None


Neurological History: Reports: Brain Injury, Concussion, Seizure


Other Neuro History: last seizure episode 1995


Psychiatric History: Reports: None


Endocrine/Metabolic History: Reports: Diabetes, Type II, Obesity/BMI 30+


Hematologic History: Reports: None


Immunologic History: Reports: None


Dermatologic History: Reports: Other (See Below)


Other Dermatologic History: poor circulation in his legs, (uses cream)





- Infectious Disease History


Infectious Disease History: Reports: Chicken Pox





- Past Surgical History


Head Surgeries/Procedures: Reports: None


HEENT Surgical History: Reports: None


Cardiovascular Surgical History: Reports: None


Respiratory Surgical History: Reports: None


GI Surgical History: Reports: EGD


Neurological Surgical History: Reports: None


Dermatological Surgical History: Reports: None





Social & Family History





- Family History


Family Medical History: Noncontributory


Cardiac: Reports: Hypertension


Respiratory: Reports: Asthma


Neurological: Reports: Other (See Below)


Other Neurological Family History: stroke


Endocrine/Metabolic: Reports: Diabetes, type II





- Tobacco Use


Smoking Status *Q: Never Smoker





- Caffeine Use


Caffeine Use: Reports: Coffee, Energy Drinks





- Recreational Drug Use


Recreational Drug Use: No





- Living Situation & Occupation


Living situation: Reports: Single, with Family


Occupation: Employed





Review of Systems





- Review of Systems


Review Of Systems: See Below





ED EXAM, GENERAL





- Physical Exam


Exam: See Below





Course





- Vital Signs


Last Recorded V/S: 





 Last Vital Signs











Temp  97 F   09/02/19 00:44


 


Pulse  77   09/02/19 00:44


 


Resp  18   09/02/19 00:44


 


BP  124/72   09/02/19 00:44


 


Pulse Ox  95   09/02/19 00:44














- Orders/Labs/Meds


Orders: 





 Active Orders 24 hr











 Category Date Time Status


 


 CULTURE STOOL + CAMPY+SHIGATOX [RM] Stat Lab  09/02/19 00:56 Ordered


 


 CULTURE WOUND [RM] Stat Lab  09/02/19 02:32 Ordered


 


 CULTURE WOUND [RM] Stat Lab  09/02/19 02:34 Ordered


 


 Clostridium Difficile [CDIFF TOX A+B] [OP] Stat Lab  09/02/19 00:56 Ordered


 


 OCCULT BLOOD DIAGNOSTIC [OP] Stat Lab  09/02/19 00:56 Ordered


 


 OVA & PARASITES BY IMMUNOASSAY [MREF] Stat Lab  09/02/19 00:56 Ordered


 


 Isolation [COMM] Stat Oth  09/02/19 00:56 Ordered











Labs: 





 Laboratory Tests











  09/02/19 09/02/19 09/02/19 Range/Units





  01:30 01:30 01:30 


 


WBC  5.87    (4.0-11.0)  K/uL


 


RBC  4.01 L    (4.50-5.90)  M/uL


 


Hgb  12.7 L    (13.0-17.0)  g/dL


 


Hct  38.7    (38.0-50.0)  %


 


MCV  96.5    (80.0-98.0)  fL


 


MCH  31.7    (27.0-32.0)  pg


 


MCHC  32.8    (31.0-37.0)  g/dL


 


RDW Std Deviation  45.9    (28.0-62.0)  fl


 


RDW Coeff of Lesvia  13    (11.0-15.0)  %


 


Plt Count  163    (150-400)  K/uL


 


MPV  10.60    (7.40-12.00)  fL


 


Neut % (Auto)  56.6    (48.0-80.0)  %


 


Lymph % (Auto)  31.3    (16.0-40.0)  %


 


Mono % (Auto)  11.8    (0.0-15.0)  %


 


Eos % (Auto)  0.0    (0.0-7.0)  %


 


Baso % (Auto)  0.3    (0.0-1.5)  %


 


Neut # (Auto)  3.3    (1.4-5.7)  K/uL


 


Lymph # (Auto)  1.8    (0.6-2.4)  K/uL


 


Mono # (Auto)  0.7    (0.0-0.8)  K/uL


 


Eos # (Auto)  0.0    (0.0-0.7)  K/uL


 


Baso # (Auto)  0.0    (0.0-0.1)  K/uL


 


Nucleated RBC %  0.0    /100WBC


 


Nucleated RBCs #  0    K/uL


 


Sodium    140  (136-148)  mmol/L


 


Potassium    4.3  (3.5-5.1)  mmol/L


 


Chloride    104  ()  mmol/L


 


Carbon Dioxide    22.0  (21.0-32.0)  mmol/L


 


BUN    19 H  (7.0-18.0)  mg/dL


 


Creatinine    1.1  (0.8-1.3)  mg/dL


 


Est Cr Clr Drug Dosing    109.63  mL/min


 


Estimated GFR (MDRD)    > 60.0  ml/min


 


Glucose    105  ()  mg/dL


 


Calcium    8.9  (8.5-10.1)  mg/dL


 


Total Bilirubin    0.2  (0.2-1.0)  mg/dL


 


AST    15  (15-37)  IU/L


 


ALT    16  (14-63)  IU/L


 


Alkaline Phosphatase    98  ()  U/L


 


Total Protein    6.7  (6.4-8.2)  g/dL


 


Albumin    3.2 L  (3.4-5.0)  g/dL


 


Globulin    3.5  (2.6-4.0)  g/dL


 


Albumin/Globulin Ratio    0.9  (0.9-1.6)  


 


Urine Color   YELLOW   


 


Urine Appearance   CLEAR   


 


Urine pH   6.5   (5.0-8.0)  


 


Ur Specific Gravity   1.015   (1.001-1.035)  


 


Urine Protein   NEGATIVE   (NEGATIVE)  mg/dL


 


Urine Glucose (UA)   NEGATIVE   (NEGATIVE)  mg/dL


 


Urine Ketones   TRACE H   (NEGATIVE)  mg/dL


 


Urine Occult Blood   NEGATIVE   (NEGATIVE)  


 


Urine Nitrite   NEGATIVE   (NEGATIVE)  


 


Urine Bilirubin   NEGATIVE   (NEGATIVE)  


 


Urine Urobilinogen   0.2   (<2.0)  EU/dL


 


Ur Leukocyte Esterase   NEGATIVE   (NEGATIVE)  











Meds: 





Medications














Discontinued Medications














Generic Name Dose Route Start Last Admin





  Trade Name Alona  PRN Reason Stop Dose Admin


 


Sodium Chloride  1,000 mls @ 999 mls/hr  09/02/19 00:55  09/02/19 01:33





  Normal Saline  IV  09/02/19 01:55  999 mls/hr





  STAT ONE   Administration





     





     





     





     














Departure





- Departure


Time of Disposition: 03:08


Disposition: Home, Self-Care 01


Condition: Good


Clinical Impression: 


 Encounter for medical screening examination, Venous stasis dermatitis of both 

lower extremities








- Discharge Information


Referrals: 


Ken Montemayor MD [Primary Care Provider] - 


Additional Instructions: 


Provide stool collection equipment to provide to primary care for testing


Return if symptoms persist or worsen


Medication as prescribed


Continue current treatments as prescribed





[facility] - Primary Care


[address] [place] [id number]


Phone: [phone]


Fax: [phone]

















The following information is given to patients seen in the emergency department 

who are being discharged to home. This information is to outline your options 

for follow-up care. We provide all patients seen in our emergency department 

with a follow-up referral.





The need for follow-up, as well as the timing and circumstances, are variable 

depending upon the specifics of your emergency department visit.





If you don't have a primary care physician on staff, we will provide you with a 

referral. We always advise you to contact your personal physician following an 

emergency department visit to inform them of the circumstance of the visit and 

for follow-up with them and/or the need for any referrals to a consulting 

specialist.





The emergency department will also refer you to a specialist when appropriate. 

This referral assures that you have the opportunity for follow-up care with a 

specialist. All of these measure are taken in an effort to provide you with 

optimal care, which includes your follow-up.





Under all circumstances we always encourage you to contact your private 

physician who remains a resource for coordinating your care. When calling for 

follow-up care, please make the office aware that this follow-up is from your 

recent emergency room visit. If for any reason you are refused follow-up, 

please contact the [facility] emergency department at [phone] 

and asked to speak to the emergency department charge nurse.








- My Orders


Last 24 Hours: 





My Active Orders





09/02/19 00:56


CULTURE STOOL + CAMPY+SHIGATOX [RM] Stat 


Clostridium Difficile [CDIFF TOX A+B] [OP] Stat 


OCCULT BLOOD DIAGNOSTIC [OP] Stat 


OVA & PARASITES BY IMMUNOASSAY [MREF] Stat 


Isolation [COMM] Stat 





09/02/19 02:32


CULTURE WOUND [RM] Stat 





09/02/19 02:34


CULTURE WOUND [RM] Stat 














- Assessment/Plan


Last 24 Hours: 





My Active Orders





09/02/19 00:56


CULTURE STOOL + CAMPY+SHIGATOX [RM] Stat 


Clostridium Difficile [CDIFF TOX A+B] [OP] Stat 


OCCULT BLOOD DIAGNOSTIC [OP] Stat 


OVA & PARASITES BY IMMUNOASSAY [MREF] Stat 


Isolation [COMM] Stat 





09/02/19 02:32


CULTURE WOUND [RM] Stat 





09/02/19 02:34


CULTURE WOUND [RM] Stat

## 2019-10-18 NOTE — PCM48HPAN
Post Anesthesia Note





- EVALUATION WITHIN 48HRS OF ANESTHETIC


Vital Signs in Normal Range: Yes


Patient Participated in Evaluation: Yes


Respiratory Function Stable: Yes


Airway Patent: Yes


Cardiovascular Function Stable: Yes


Hydration Status Stable: Yes


Pain Control Satisfactory: Yes


Nausea and Vomiting Control Satisfactory: Yes


Mental Status Recovered: Yes


Vital Signs: 


 Last Vital Signs











Temp  97.7 F   10/18/19 07:46


 


Pulse  48 L  10/18/19 09:30


 


Resp  14   10/18/19 09:30


 


BP  107/52 L  10/18/19 09:30


 


Pulse Ox  95   10/18/19 09:30

## 2019-10-18 NOTE — PCM.PREANE
Preanesthetic Assessment





- Anesthesia/Transfusion/Family Hx


Anesthesia History: Prior Anesthesia Without Reaction (EGD 1 yr ago)


Family History of Anesthesia Reaction: No


Transfusion History: No Prior Transfusion(s)





- Physical Assessment


NPO Status Date: 10/17/19


Vital Signs: 





 Last Vital Signs











Temp  97.7 F   10/18/19 07:46


 


Pulse  56 L  10/18/19 07:46


 


Resp  16   10/18/19 07:46


 


BP  114/60   10/18/19 07:46


 


Pulse Ox  99   10/18/19 07:46











Height: 6 ft 6 in


Weight: 151.046 kg


ASA Class: 3


Mental Status: Alert & Oriented x3


Airway Class: Mallampati = 2


Dentition: Reports: Edentulous


ROM/Head Extension: Full


Lungs: Clear to Auscultation, Normal Respiratory Effort


Cardiovascular: Regular Rate, Regular Rhythm





- Allergies


Allergies/Adverse Reactions: 


 Allergies











Allergy/AdvReac Type Severity Reaction Status Date / Time


 


codeine AdvReac  Nose Bleeds Verified 10/14/19 11:45














- Blood


Blood Available: No





- Anesthesia Plan


Pre-Op Medication Ordered: None





- Acknowledgements


Anesthesia Type Planned: General Anesthesia


Pt an Appropriate Candidate for the Planned Anesthesia: Yes


Alternatives and Risks of Anesthesia Discussed w Pt/Guardian: Yes


Pt/Guardian Understands and Agrees with Anesthesia Plan: Yes


Additional Comments: 





Anes prob list: DM2 on metformin and lisinipril, Asthma - on daily meds, denies 

recent exac, no recent ED visits for asthma, and no recent oral steroid use, 

Denies HARRIET sx, wife denies him snoring or having apnic episodes, Seizure 

disorder- on tegretal, last seizure in 2008? Chart states hx of concussion TBI, 

Hx of DVT 3 yr ago. has stasis dermatitis of BLE with healing stasisi ulcer, 

GERD, 





PLAN: preoxigenation in pre op holding, tiva, airway as needed.








PreAnesthesia Questionnaire


HEENT History: Reports: Other (See Below)


Other HEENT History: has upper and lower dentures,


Cardiovascular History: Reports: Blood Clots/VTE/DVT, High Cholesterol, 

Hypertension


Other Cardiovascular History: hx DVT to lower extremity


Respiratory History: Reports: Asthma


Gastrointestinal History: Reports: GERD, Helicobacter Pylori


Genitourinary History: Reports: None


Musculoskeletal History: Reports: None


Neurological History: Reports: Brain Injury, Concussion, Seizure


Other Neuro History: last seizure episode 1995


Psychiatric History: Reports: None


Endocrine/Metabolic History: Reports: Diabetes, Type II, Obesity/BMI 30+


Hematologic History: Reports: Anemia


Immunologic History: Reports: None


Oncologic (Cancer) History: Reports: None


Dermatologic History: Reports: Cellulitis, Venous Stasis Dermatitis, Other (See 

Below)


Other Dermatologic History: poor circulation in his legs, (uses cream)





- Infectious Disease History


Infectious Disease History: Reports: Chicken Pox





- Past Surgical History


Head Surgeries/Procedures: Reports: None


HEENT Surgical History: Reports: None


Cardiovascular Surgical History: Reports: None


Respiratory Surgical History: Reports: None


GI Surgical History: Reports: EGD


Male  Surgical History: Reports: None


Endocrine Surgical History: Reports: None


Neurological Surgical History: Reports: None


Musculoskeletal Surgical History: Reports: None


Oncologic Surgical History: Reports: None


Dermatological Surgical History: Reports: None





- SUBSTANCE USE


Smoking Status *Q: Never Smoker





- HOME MEDS


Home Medications: 


 Home Meds





Budesonide/Formoterol [Symbicort 160-4.5 MCG] 2 puff INH BID 07/01/14 [History]


carBAMazepine [Tegretol XR] 200 mg PO TID 07/01/14 [History]


Lisinopril 10 mg PO DAILY 09/18/17 [History]


atorvaSTATin [Lipitor] 5 mg PO DAILY 09/18/17 [History]


metFORMIN [Glucophage XR] 1,000 mg PO BIDMEALS 09/18/17 [History]


Aspirin [Halfprin] 81 mg PO DAILY 09/07/18 [History]


Albuterol [Ventolin HFA] 2 puff INH Q4H PRN #1 inhaler 09/08/18 [Rx]


Ferrous Sulfate [Iron] 325 mg PO DAILY 10/14/19 [History]


Pantoprazole Sodium [Protonix] 20 mg PO DAILY 10/14/19 [History]


Silver Sulfadiazine [Silvadene 1% Cream 50 GM] 1 applic TOP ASDIRECTED PRN 10/14

/19 [History]











- CURRENT (IN HOUSE) MEDS


Current Meds: 





 Current Medications





Lactated Ringer's (Ringers, Lactated)  1,000 mls @ 125 mls/hr IV ASDIRECTED HESHAM


   Last Admin: 10/18/19 07:53 Dose:  125 mls/hr





Discontinued Medications





Lidocaine (Xylocaine-Mpf 2%) Confirm Administered Dose 5 ml .ROUTE .STK-MED ONE


   Stop: 10/18/19 07:59


Propofol (Diprivan  20 Ml) Confirm Administered Dose 400 mg .ROUTE .STK-MED ONE


   Stop: 10/18/19 07:59

## 2019-10-18 NOTE — OR
SURGEON:

Ruddy Bailey MD

 

DATE OF PROCEDURE:  10/18/2019

 

PREOPERATIVE DIAGNOSES:

Helicobacter pylori infection and gastroesophageal reflux disease.

 

POSTOPERATIVE DIAGNOSES:

Helicobacter pylori infection and gastroesophageal reflux disease.

 

PROCEDURE PERFORMED:

Esophagogastroduodenoscopy with biopsy.

 

DESCRIPTION OF PROCEDURE:

EGD:  The patient was taken to the endoscopy room, and with the CRNA, Diprivan

was administered.

 

A well-lubricated EGD scope was gently inserted through the oropharynx, down the

esophagus, passing through the gastroesophageal junction, into the stomach.  The

mucosa was examined upon the passage.  Any etiology will be noted.  Once in the

stomach, we continued to advance to the distal antrum, passed through the

pylorus into the second portion of the duodenum.  Again, the mucosa was examined

for any abnormality and etiology.

 

The scope was then retrieved back to the stomach and then retroflexed to look at

the fundus of the stomach.  If a biopsy was indicated, we will biopsy the

antrum, body, and gastroesophageal junction.  The air will be sucked out while

the scope is retrieved to reduce the patient's discomfort.

 

The patient tolerated the procedure well.  There were no intraoperative

complications.  Dr. Bailey was present through the whole procedure.

 

Prior to surgery, a time-out had been called, the patient identified, procedure

identified and antibiotic administered.

 

FINDINGS:

1. The patient is easily sedated with CRNA and Diprivan, the patient is

    soundly snoring.

2. The patient's oropharynx and proximal esophagus are free of disease.  No

    stricture or inflammation.  Distal esophagus at 40 shows moderate salmon-

    colored change consistent acid reflux and the previously observed flame-

    like structure resolved.  Stomach rugae are normal in appearance.  No

    blood, no ulcer, no food particle, no bile.  Antrum is mildly inflamed and

    duodenum is grossly normal.  Retroflexed look at the fundus of stomach,

    there is no hiatal hernia.  Biopsy done at antrum, body, GE junction at 40

    and sucked out the gas while scope pulling.

 

 

AKANKSHA / MARCELL

DD:  10/18/2019 08:57:47

DT:  10/18/2019 11:13:04

Job #:  [id number]

## 2019-10-18 NOTE — PCM.POSTAN
POST ANESTHESIA ASSESSMENT





- MENTAL STATUS


Mental Status: Alert, Oriented





- VITAL SIGNS


Vital Signs: 


 Last Vital Signs











Temp  36.5 C   10/18/19 07:46


 


Pulse  59 L  10/18/19 09:10


 


Resp  16   10/18/19 09:10


 


BP  101/59 L  10/18/19 09:10


 


Pulse Ox  98   10/18/19 09:10














- RESPIRATORY


Respiratory Status: Respiratory Rate WNL, Airway Patent, O2 Saturation Stable





- CARDIOVASCULAR


CV Status: Pulse Rate WNL, Blood Pressure Stable





- GASTROINTESTINAL


GI Status: No Symptoms





- POST OP HYDRATION


Hydration Status: Adequate & Stable

## 2019-10-18 NOTE — PCM.OPNOTE
- General Post-Op/Procedure Note


Date of Surgery/Procedure: 10/18/19


Findings: 


see dict [id number]


Pre Op Diagnosis: hpylori infection and recurrent gerd


Post-Op Diagnosis: Same


Anesthesia Technique: Moderate Sedation


Primary Surgeon: Ruddy Bailey


Pathology: 





sent


Complications: None


Condition: Good